# Patient Record
Sex: FEMALE | Race: WHITE | URBAN - METROPOLITAN AREA
[De-identification: names, ages, dates, MRNs, and addresses within clinical notes are randomized per-mention and may not be internally consistent; named-entity substitution may affect disease eponyms.]

---

## 2021-06-17 ENCOUNTER — OFFICE VISIT (OUTPATIENT)
Dept: PRIMARY CARE CLINIC | Age: 41
End: 2021-06-17
Payer: COMMERCIAL

## 2021-06-17 VITALS
BODY MASS INDEX: 36.2 KG/M2 | SYSTOLIC BLOOD PRESSURE: 147 MMHG | RESPIRATION RATE: 16 BRPM | WEIGHT: 258.6 LBS | HEIGHT: 71 IN | DIASTOLIC BLOOD PRESSURE: 100 MMHG | TEMPERATURE: 97.3 F | HEART RATE: 85 BPM

## 2021-06-17 DIAGNOSIS — Z76.89 ENCOUNTER TO ESTABLISH CARE: Primary | ICD-10-CM

## 2021-06-17 DIAGNOSIS — R03.0 ELEVATED BP WITHOUT DIAGNOSIS OF HYPERTENSION: ICD-10-CM

## 2021-06-17 DIAGNOSIS — L81.1 MELASMA: ICD-10-CM

## 2021-06-17 DIAGNOSIS — Z98.890 HISTORY OF BACK SURGERY: ICD-10-CM

## 2021-06-17 PROCEDURE — G8427 DOCREV CUR MEDS BY ELIG CLIN: HCPCS | Performed by: FAMILY MEDICINE

## 2021-06-17 PROCEDURE — 1036F TOBACCO NON-USER: CPT | Performed by: FAMILY MEDICINE

## 2021-06-17 PROCEDURE — 99204 OFFICE O/P NEW MOD 45 MIN: CPT | Performed by: FAMILY MEDICINE

## 2021-06-17 PROCEDURE — G8417 CALC BMI ABV UP PARAM F/U: HCPCS | Performed by: FAMILY MEDICINE

## 2021-06-17 RX ORDER — HYDROQUINONE 40 MG/G
CREAM TOPICAL
Qty: 1 TUBE | Refills: 1 | Status: SHIPPED | OUTPATIENT
Start: 2021-06-17 | End: 2022-01-06

## 2021-06-17 SDOH — ECONOMIC STABILITY: FOOD INSECURITY: WITHIN THE PAST 12 MONTHS, YOU WORRIED THAT YOUR FOOD WOULD RUN OUT BEFORE YOU GOT MONEY TO BUY MORE.: NEVER TRUE

## 2021-06-17 SDOH — ECONOMIC STABILITY: FOOD INSECURITY: WITHIN THE PAST 12 MONTHS, THE FOOD YOU BOUGHT JUST DIDN'T LAST AND YOU DIDN'T HAVE MONEY TO GET MORE.: NEVER TRUE

## 2021-06-17 ASSESSMENT — ENCOUNTER SYMPTOMS
VOMITING: 0
SHORTNESS OF BREATH: 0
SINUS PRESSURE: 0
RHINORRHEA: 0
DIARRHEA: 0
ABDOMINAL PAIN: 0
SORE THROAT: 0
CHEST TIGHTNESS: 0
BACK PAIN: 1
CONSTIPATION: 0
NAUSEA: 0
WHEEZING: 0
BLOOD IN STOOL: 0
SINUS PAIN: 0
COUGH: 0

## 2021-06-17 ASSESSMENT — PATIENT HEALTH QUESTIONNAIRE - PHQ9
SUM OF ALL RESPONSES TO PHQ9 QUESTIONS 1 & 2: 0
1. LITTLE INTEREST OR PLEASURE IN DOING THINGS: 0
DEPRESSION UNABLE TO ASSESS: FUNCTIONAL CAPACITY MOTIVATION LIMITS ACCURACY
2. FEELING DOWN, DEPRESSED OR HOPELESS: 0
SUM OF ALL RESPONSES TO PHQ QUESTIONS 1-9: 0

## 2021-06-17 ASSESSMENT — SOCIAL DETERMINANTS OF HEALTH (SDOH): HOW HARD IS IT FOR YOU TO PAY FOR THE VERY BASICS LIKE FOOD, HOUSING, MEDICAL CARE, AND HEATING?: NOT HARD AT ALL

## 2021-06-17 NOTE — PROGRESS NOTES
Chief Complaint   Patient presents with   [address]     would like labs done    Back Problem     would like to start PT back up         HPI:  Manuelito Kapoor is a 41 y.o. (: 1980) here today to establish care. She is worried about her weight and would like to update blood work. She is working on lifestyle changes. Elevated BP in triage. Interested in PT for her back pain and discomfort. Has h/o back surgery: laminectomy 2007 and 3 level fusion 2013 due to herniated disc slipping. Used to have PT on and off. Has also had trigger point injections  Curious about botox injects for this. H/o of Melasma on upper lip.used to use a topical medication but cannot remember the name. Would like a prescription for this. Is currently only using sunblock when she goes outside. Review of Systems   Constitutional: Negative for activity change, appetite change, chills, fatigue, fever and unexpected weight change. HENT: Negative for congestion, postnasal drip, rhinorrhea, sinus pressure, sinus pain, sneezing and sore throat. Eyes: Negative for visual disturbance. Respiratory: Negative for cough, chest tightness, shortness of breath and wheezing. Cardiovascular: Negative for chest pain and palpitations. Gastrointestinal: Negative for abdominal pain, blood in stool, constipation, diarrhea, nausea and vomiting. Endocrine: Negative for cold intolerance, heat intolerance, polydipsia and polyuria. Genitourinary: Negative for dysuria, frequency, vaginal bleeding and vaginal discharge. Musculoskeletal: Positive for back pain. Negative for arthralgias, joint swelling, myalgias and neck pain. Skin: Negative for rash and wound. Allergic/Immunologic: Negative for environmental allergies. Neurological: Negative for dizziness, tremors, syncope, weakness, light-headedness, numbness and headaches. Hematological: Negative for adenopathy.    Psychiatric/Behavioral: Negative for behavioral problems, decreased concentration, sleep disturbance and suicidal ideas. The patient is not nervous/anxious. History reviewed. No pertinent past medical history. Family History   Problem Relation Age of Onset    Heart Attack Father     High Blood Pressure Father        Social History     Tobacco Use    Smoking status: Former Smoker     Types: Cigarettes     Quit date: 2006     Years since quitting: 15.4    Smokeless tobacco: Never Used   Substance Use Topics    Alcohol use: Not on file    Drug use: Not on file       New Prescriptions    HYDROQUINONE 4 % CREAM    Apply topically 2 times daily. Meds Prior to visit:  No current outpatient medications on file prior to visit. No current facility-administered medications on file prior to visit. Allergies   Allergen Reactions    Iodine        OBJECTIVE:  BP (!) 147/100   Pulse 85   Temp 97.3 °F (36.3 °C) (Temporal)   Resp 16   Ht 5' 10.5\" (1.791 m)   Wt 258 lb 9.6 oz (117.3 kg)   LMP 06/11/2021   Breastfeeding No   BMI 36.58 kg/m²   BP Readings from Last 2 Encounters:   06/17/21 (!) 147/100     Wt Readings from Last 3 Encounters:   06/17/21 258 lb 9.6 oz (117.3 kg)       Physical Exam  Vitals reviewed. Constitutional:       General: She is not in acute distress. Appearance: Normal appearance. She is well-developed. She is obese. HENT:      Head: Normocephalic and atraumatic. Right Ear: Tympanic membrane, ear canal and external ear normal. There is no impacted cerumen. Left Ear: Tympanic membrane, ear canal and external ear normal. There is no impacted cerumen. Nose: Nose normal.      Mouth/Throat:      Mouth: Mucous membranes are moist.      Pharynx: Oropharynx is clear. No oropharyngeal exudate or posterior oropharyngeal erythema. Eyes:      General: No scleral icterus. Right eye: No discharge. Left eye: No discharge. Extraocular Movements: Extraocular movements intact. Conjunctiva/sclera: Conjunctivae normal.      Pupils: Pupils are equal, round, and reactive to light. Cardiovascular:      Rate and Rhythm: Normal rate and regular rhythm. Pulses: Normal pulses. Heart sounds: Normal heart sounds. No murmur heard. Comments: Radial and pedal pulses intact  Pulmonary:      Effort: Pulmonary effort is normal. No respiratory distress. Breath sounds: Normal breath sounds. No wheezing or rales. Chest:      Chest wall: No tenderness. Abdominal:      General: Bowel sounds are normal.      Palpations: Abdomen is soft. There is no mass. Tenderness: There is no abdominal tenderness. There is no guarding or rebound. Hernia: No hernia is present. Comments: Normal liver and spleen. No organomegaly   Musculoskeletal:         General: No tenderness. Normal range of motion. Cervical back: Normal range of motion and neck supple. Right lower leg: No edema. Left lower leg: No edema. Comments: Intact in all extremities   Lymphadenopathy:      Cervical: No cervical adenopathy. Skin:     General: Skin is warm. Capillary Refill: Capillary refill takes less than 2 seconds. Findings: No erythema or rash. Neurological:      General: No focal deficit present. Mental Status: She is alert and oriented to person, place, and time. Mental status is at baseline. Motor: No weakness or abnormal muscle tone. Coordination: Coordination normal.      Gait: Gait normal.      Deep Tendon Reflexes: Reflexes normal.   Psychiatric:         Mood and Affect: Mood normal.         Behavior: Behavior normal.         Thought Content: Thought content normal.         Judgment: Judgment normal.           ASSESSMENT/PLAN:  1. Encounter to establish care  VS reviewed     BMI reviewed   All questions answered. F/u discussed. Healthy lifestyle modifications discussed. - CBC Auto Differential; Future  - Comprehensive Metabolic Panel; Future    2. BMI 36.0-36.9,adult  Will update labs. Interested in bariatric surgery  Would like referral to weight management clinic. Will rule out thyroid disorder and screen for diabetes. - Lipid Panel; Future  - TSH with Reflex; Future  - HEMOGLOBIN A1C; Future  - [facility] - Rick Hobson DO, Bariatric Surgery, [place]    3. History of back surgery  Referral to physical therapy placed. History of laminectomy and fusion  - [facility] - [facility]    4. Elevated BP without diagnosis of hypertension  Follow-up in 2 weeks to monitor blood pressure  If elevated in 2 weeks, will discuss diagnosis of hypertension and treatment    5. Melasma  We will try hydroquinone 4% cream and follow-up in 2 weeks to gauge improvement  - hydroquinone 4 % cream; Apply topically 2 times daily. Dispense: 1 Tube; Refill: 1        Discussed use, benefit, and side effects of prescribed medications. Barriers to medication compliance addressed. All patient questions answered. Pt voiced understanding. RTC No follow-ups on file.     Future Appointments   Date Time Provider Evelina Concepcion   7/1/2021  8:00 AM MD Sergio Ferguson MD  6/17/2021  2:21 PM

## 2021-06-18 DIAGNOSIS — Z76.89 ENCOUNTER TO ESTABLISH CARE: ICD-10-CM

## 2021-06-18 LAB
A/G RATIO: 1.8 (ref 1.1–2.2)
ALBUMIN SERPL-MCNC: 4.8 G/DL (ref 3.4–5)
ALP BLD-CCNC: 72 U/L (ref 40–129)
ALT SERPL-CCNC: 31 U/L (ref 10–40)
ANION GAP SERPL CALCULATED.3IONS-SCNC: 15 MMOL/L (ref 3–16)
AST SERPL-CCNC: 27 U/L (ref 15–37)
BASOPHILS ABSOLUTE: 0 K/UL (ref 0–0.2)
BASOPHILS RELATIVE PERCENT: 0.5 %
BILIRUB SERPL-MCNC: 0.5 MG/DL (ref 0–1)
BUN BLDV-MCNC: 9 MG/DL (ref 7–20)
CALCIUM SERPL-MCNC: 9.6 MG/DL (ref 8.3–10.6)
CHLORIDE BLD-SCNC: 101 MMOL/L (ref 99–110)
CHOLESTEROL, TOTAL: 234 MG/DL (ref 0–199)
CO2: 22 MMOL/L (ref 21–32)
CREAT SERPL-MCNC: 0.7 MG/DL (ref 0.6–1.1)
EOSINOPHILS ABSOLUTE: 0.1 K/UL (ref 0–0.6)
EOSINOPHILS RELATIVE PERCENT: 0.8 %
GFR AFRICAN AMERICAN: >60
GFR NON-AFRICAN AMERICAN: >60
GLOBULIN: 2.6 G/DL
GLUCOSE BLD-MCNC: 99 MG/DL (ref 70–99)
HCT VFR BLD CALC: 40.7 % (ref 36–48)
HDLC SERPL-MCNC: 60 MG/DL (ref 40–60)
HEMOGLOBIN: 13.6 G/DL (ref 12–16)
LDL CHOLESTEROL CALCULATED: 147 MG/DL
LYMPHOCYTES ABSOLUTE: 1.7 K/UL (ref 1–5.1)
LYMPHOCYTES RELATIVE PERCENT: 24.4 %
MCH RBC QN AUTO: 31.3 PG (ref 26–34)
MCHC RBC AUTO-ENTMCNC: 33.6 G/DL (ref 31–36)
MCV RBC AUTO: 93.3 FL (ref 80–100)
MONOCYTES ABSOLUTE: 0.5 K/UL (ref 0–1.3)
MONOCYTES RELATIVE PERCENT: 6.9 %
NEUTROPHILS ABSOLUTE: 4.8 K/UL (ref 1.7–7.7)
NEUTROPHILS RELATIVE PERCENT: 67.4 %
PDW BLD-RTO: 12.4 % (ref 12.4–15.4)
PLATELET # BLD: 314 K/UL (ref 135–450)
PMV BLD AUTO: 8.9 FL (ref 5–10.5)
POTASSIUM SERPL-SCNC: 4.4 MMOL/L (ref 3.5–5.1)
RBC # BLD: 4.36 M/UL (ref 4–5.2)
SODIUM BLD-SCNC: 138 MMOL/L (ref 136–145)
TOTAL PROTEIN: 7.4 G/DL (ref 6.4–8.2)
TRIGL SERPL-MCNC: 133 MG/DL (ref 0–150)
TSH REFLEX: 2.24 UIU/ML (ref 0.27–4.2)
VLDLC SERPL CALC-MCNC: 27 MG/DL
WBC # BLD: 7.1 K/UL (ref 4–11)

## 2021-06-19 LAB
ESTIMATED AVERAGE GLUCOSE: 99.7 MG/DL
HBA1C MFR BLD: 5.1 %

## 2021-06-28 ENCOUNTER — TELEPHONE (OUTPATIENT)
Dept: PRIMARY CARE CLINIC | Age: 41
End: 2021-06-28

## 2021-06-28 DIAGNOSIS — Z98.890 HISTORY OF BACK SURGERY: Primary | ICD-10-CM

## 2021-06-28 NOTE — TELEPHONE ENCOUNTER
----- Message from Huang Zhang sent at 6/28/2021  9:37 AM EDT -----  Subject: Message to Provider    QUESTIONS  Information for Provider? Patient is calling because her PCP referred the   patient for physician therapy but the location referred doesn't accept the   patient's insurance. The patient is requesting for the doctor to write a   script for [facility] as they are covered by her insurance. ---------------------------------------------------------------------------  --------------  Ludin SMITH  What is the best way for the office to contact you? OK to respond with   electronic message via [employer] portal (only for patients who have   registered [employer] account)  Preferred Call Back Phone Number? [phone]  ---------------------------------------------------------------------------  --------------  SCRIPT ANSWERS  Relationship to Patient?  Self

## 2021-06-30 NOTE — TELEPHONE ENCOUNTER
Please print and fax the [facility] referral placed today. Phone number of the office is on the referral but we need a fax number.   Thank you,  Dr. Staton Part

## 2021-06-30 NOTE — PROGRESS NOTES
Chief Complaint   Patient presents with    Discuss Labs         HPI:  Janeth Kinney is a 41 y.o. (: 1980) here today for BP check. BP looks great in triage! Wt mgmt appt set up for tomorrow. BP Readings from Last 3 Encounters:   21 125/81   21 (!) 147/100       Wt Readings from Last 3 Encounters:   21 260 lb (117.9 kg)   21 258 lb 9.6 oz (117.3 kg)     Reviewed results and discussed elevated cholesterol and LDL and total cholesterol goals. HLD  Rx: no med  The 10-year ASCVD risk score (Nadeem Vega, et al., 2013) is: 0.7%    Values used to calculate the score:      Age: 41 years      Sex: Female      Is Non- : No      Diabetic: No      Tobacco smoker: No      Systolic Blood Pressure: 268 mmHg      Is BP treated: No      HDL Cholesterol: 60 mg/dL      Total Cholesterol: 234 mg/dL      Review of Systems   Constitutional: Negative for appetite change, chills, fatigue and fever. HENT: Negative for congestion. Eyes: Negative for pain and visual disturbance. Respiratory: Negative for cough and shortness of breath. Cardiovascular: Negative for chest pain and palpitations. Gastrointestinal: Negative for abdominal pain, constipation and diarrhea. Genitourinary: Negative for difficulty urinating. Musculoskeletal: Negative for arthralgias. Skin: Negative for rash and wound. Neurological: Negative for dizziness, weakness, light-headedness and headaches. Hematological: Does not bruise/bleed easily. Psychiatric/Behavioral: Negative for behavioral problems. History reviewed. No pertinent past medical history.     Family History   Problem Relation Age of Onset    Heart Attack Father     High Blood Pressure Father        Social History     Tobacco Use    Smoking status: Former Smoker     Types: Cigarettes     Quit date:      Years since quitting: 15.5    Smokeless tobacco: Never Used   Substance Use Topics    Alcohol use: Not on file    Drug use: Not on file       New Prescriptions    No medications on file       Meds Prior to visit:  Current Outpatient Medications on File Prior to Visit   Medication Sig Dispense Refill    hydroquinone 4 % cream Apply topically 2 times daily. 1 Tube 1     No current facility-administered medications on file prior to visit. Allergies   Allergen Reactions    Iodine        OBJECTIVE:  /81 (Site: Right Upper Arm, Position: Sitting)   Pulse 80   Temp 97.7 °F (36.5 °C) (Temporal)   Wt 260 lb (117.9 kg)   LMP 06/11/2021   Breastfeeding No   BMI 36.78 kg/m²   BP Readings from Last 2 Encounters:   07/01/21 125/81   06/17/21 (!) 147/100     Wt Readings from Last 3 Encounters:   07/01/21 260 lb (117.9 kg)   06/17/21 258 lb 9.6 oz (117.3 kg)       Physical Exam  Vitals reviewed. Constitutional:       General: She is not in acute distress. Appearance: Normal appearance. She is not ill-appearing. HENT:      Head: Normocephalic and atraumatic. Right Ear: External ear normal.      Left Ear: External ear normal.      Nose: Nose normal. No congestion. Mouth/Throat:      Mouth: Mucous membranes are moist.      Pharynx: Oropharynx is clear. No oropharyngeal exudate. Eyes:      Extraocular Movements: Extraocular movements intact. Conjunctiva/sclera: Conjunctivae normal.      Pupils: Pupils are equal, round, and reactive to light. Cardiovascular:      Rate and Rhythm: Normal rate and regular rhythm. Pulses: Normal pulses. Heart sounds: Normal heart sounds. Pulmonary:      Effort: Pulmonary effort is normal. No respiratory distress. Breath sounds: Normal breath sounds. No stridor. No wheezing, rhonchi or rales. Abdominal:      General: Bowel sounds are normal.      Palpations: Abdomen is soft. Tenderness: There is no abdominal tenderness. Musculoskeletal:         General: Normal range of motion. Cervical back: Normal range of motion and neck supple. Right lower leg: No edema. Left lower leg: No edema. Skin:     General: Skin is warm. Capillary Refill: Capillary refill takes less than 2 seconds. Findings: No erythema or rash. Neurological:      General: No focal deficit present. Mental Status: She is alert and oriented to person, place, and time. Mental status is at baseline. Motor: No weakness. Coordination: Coordination normal.      Gait: Gait normal.   Psychiatric:         Mood and Affect: Mood normal.         Behavior: Behavior normal.         Thought Content: Thought content normal.         Judgment: Judgment normal.         Lab Results   Component Value Date    WBC 7.1 06/18/2021    HGB 13.6 06/18/2021    HCT 40.7 06/18/2021    MCV 93.3 06/18/2021     06/18/2021     Lab Results   Component Value Date     06/18/2021    K 4.4 06/18/2021     06/18/2021    CO2 22 06/18/2021    BUN 9 06/18/2021    CREATININE 0.7 06/18/2021    GLUCOSE 99 06/18/2021    CALCIUM 9.6 06/18/2021    PROT 7.4 06/18/2021    LABALBU 4.8 06/18/2021    BILITOT 0.5 06/18/2021    ALKPHOS 72 06/18/2021    AST 27 06/18/2021    ALT 31 06/18/2021    LABGLOM >60 06/18/2021    GFRAA >60 06/18/2021    AGRATIO 1.8 06/18/2021    GLOB 2.6 06/18/2021     Lab Results   Component Value Date    CHOL 234 (H) 06/18/2021     Lab Results   Component Value Date    TRIG 133 06/18/2021     Lab Results   Component Value Date    HDL 60 06/18/2021     Lab Results   Component Value Date    LDLCALC 147 (H) 06/18/2021     Lab Results   Component Value Date    LABVLDL 27 06/18/2021     Lab Results   Component Value Date    LABA1C 5.1 06/18/2021         ASSESSMENT/PLAN:  1. Elevated BP without diagnosis of hypertension  Controlled in triage  States the last time is elevated likely because she finished argument with her  before coming in. No treatment today  Keep an course of weight management, lifestyle changes and dietary modifications    2.  BMI 36.0-36.9,adult  Has weight management appointment tomorrow with Dr. Merry Ortiz    3. Mixed hyperlipidemia  Discussed dietary modifications and ways to work on her cholesterol without medications. Keep an course of weight management, lifestyle changes and dietary modifications  Discussed ASCVD risk    I have spent 30 minutes of face to face time with the patient with more than 50%  spent counseling , educating and coordinating care for wt mgmt, BP control and HLD. Discussed use, benefit, and side effects of prescribed medications. Barriers to medication compliance addressed. All patient questions answered. Pt voiced understanding. RTC Return if symptoms worsen or fail to improve.     Future Appointments   Date Time Provider Evelina Concepcion   7/2/2021 12:30 PM SCHEDULE, [facility]   7/11/2021  3:30 PM Letitia Mckenzie MD [facility]       Gabriela Alcazar MD  7/1/2021  8:38 AM

## 2021-07-01 ENCOUNTER — OFFICE VISIT (OUTPATIENT)
Dept: PRIMARY CARE CLINIC | Age: 41
End: 2021-07-01
Payer: COMMERCIAL

## 2021-07-01 VITALS
HEART RATE: 80 BPM | TEMPERATURE: 97.7 F | SYSTOLIC BLOOD PRESSURE: 125 MMHG | WEIGHT: 260 LBS | BODY MASS INDEX: 36.78 KG/M2 | DIASTOLIC BLOOD PRESSURE: 81 MMHG

## 2021-07-01 DIAGNOSIS — E78.2 MIXED HYPERLIPIDEMIA: ICD-10-CM

## 2021-07-01 DIAGNOSIS — R03.0 ELEVATED BP WITHOUT DIAGNOSIS OF HYPERTENSION: Primary | ICD-10-CM

## 2021-07-01 PROCEDURE — 1036F TOBACCO NON-USER: CPT | Performed by: FAMILY MEDICINE

## 2021-07-01 PROCEDURE — G8417 CALC BMI ABV UP PARAM F/U: HCPCS | Performed by: FAMILY MEDICINE

## 2021-07-01 PROCEDURE — G8427 DOCREV CUR MEDS BY ELIG CLIN: HCPCS | Performed by: FAMILY MEDICINE

## 2021-07-01 PROCEDURE — 99214 OFFICE O/P EST MOD 30 MIN: CPT | Performed by: FAMILY MEDICINE

## 2021-07-01 ASSESSMENT — ENCOUNTER SYMPTOMS
EYE PAIN: 0
SHORTNESS OF BREATH: 0
ABDOMINAL PAIN: 0
COUGH: 0
DIARRHEA: 0
CONSTIPATION: 0

## 2021-07-02 ENCOUNTER — OFFICE VISIT (OUTPATIENT)
Dept: BARIATRICS/WEIGHT MGMT | Age: 41
End: 2021-07-02

## 2021-07-02 VITALS
HEIGHT: 71 IN | BODY MASS INDEX: 36.12 KG/M2 | DIASTOLIC BLOOD PRESSURE: 70 MMHG | WEIGHT: 258 LBS | HEART RATE: 66 BPM | SYSTOLIC BLOOD PRESSURE: 122 MMHG

## 2021-07-02 DIAGNOSIS — E66.9 OBESITY (BMI 30-39.9): ICD-10-CM

## 2021-07-02 PROCEDURE — 99999 PR OFFICE/OUTPT VISIT,PROCEDURE ONLY: CPT | Performed by: FAMILY MEDICINE

## 2021-07-02 RX ORDER — IBUPROFEN 200 MG
200 TABLET ORAL EVERY 6 HOURS PRN
COMMUNITY
End: 2022-01-06

## 2021-07-02 RX ORDER — ACETAMINOPHEN 500 MG
500 TABLET ORAL EVERY 6 HOURS PRN
COMMUNITY
End: 2022-01-06

## 2021-07-02 RX ORDER — CETIRIZINE HYDROCHLORIDE 10 MG/1
10 TABLET ORAL DAILY
COMMUNITY
End: 2022-01-06

## 2021-07-02 NOTE — PROGRESS NOTES
Jose Walter is a 41 y.o. female with a date of birth of 1980. Vitals:    07/02/21 1241   BP: 122/70   Pulse: 66   Weight: 258 lb (117 kg)   Height: 5' 11\" (1.803 m)    BMI: Body mass index is 35.98 kg/m². Obesity Classification: Class II    Weight History: Wt Readings from Last 3 Encounters:   07/02/21 258 lb (117 kg)   07/01/21 260 lb (117.9 kg)   06/17/21 258 lb 9.6 oz (117.3 kg)       Patient's lowest adult weight was 180 lb at age 26. Patient's highest adult weight was 258 lb at age 41. Patient has participated in the following weight loss programs: , counseling with RD, physician supervised diet and calorie restriction. Patient has participated in meal replacement/liquid diets - Slimfast.  Patient has participated in weight loss medications - Saxenda in 2020 with wt loss of 15-20#, stopped d/t side effects (fatigue). Patient is not lactose intolerant. Patient does have Restoration/cultural food concerns - pt has followed vegetarian lifestyle for ~ 30yrs, dislikes egg. Patient does have food allergies - shellfish. Pt reports good sleep routine. Struggles with grazing in evening. May benefit from seeing behaviorist.    24 hour recall/food frequency chart:  Wakes 5-5:30a  Breakfast: no.   Snack: yes tomato juice  Lunch: yes. - 11-11:30a 2 sandwiches + granola bar / yogurt  Snack: yes. - Fig bar + yogurt  Dinner: yes. - 2 sloppy jennifer's + homemade fries + salad / protein + starch + veggies  Snack: yes. - grazing in evening - bowl of PB cereal with full fat oatmilk + ice cream  Drinks throughout the day: Keenan Garcia with a little white grape fruit juice, coffee with half & half  Do you drink alcohol? yes. How often/how much alcohol do you drink: 3 drinks of glass of wine / tequila cocktail per week. Patient may meet the criteria for binge eating disorder - takes a lot of food to feel full. Patient does have grazing. Patient does not have night eating. Patient does have a history of or eating out of boredom. Goals  Weight: 185-195#  Health Improvement: improved back & joint pain, be able to do all the things would like and be active    Assessment  Nutritional Needs: RMR=(9.99 x 117) + (6.25 x 180.3) - (4.92 x 40 y.o.) -161  = 1925 kcal x 1.4 (light / sedentary activity factor)= 2696 kcal - 1000 (for 2 lb weight loss/week)= 1696 kcal.    Plan  Plan/Recommendations: General weight loss/lifestyle modification strategies discussed (elicit support from others; identify saboteurs; non-food rewards, etc). Diet interventions: 1500kcal LCMP. Optifast:  Is not interested  Diet Medications:  Is interested    Handouts: 1500kcal LCMP, 9\" plate    PES Statement:  Overweight/Obesity related to increased caloric intake and decreased physical activity as evidenced by BMI. Body mass index is 35.98 kg/m². Will follow up as necessary.     Hunter Montgomery, RD, LD

## 2021-07-11 ENCOUNTER — TELEMEDICINE (OUTPATIENT)
Dept: BARIATRICS/WEIGHT MGMT | Age: 41
End: 2021-07-11
Payer: COMMERCIAL

## 2021-07-11 DIAGNOSIS — E66.9 CLASS 2 OBESITY: Primary | ICD-10-CM

## 2021-07-11 DIAGNOSIS — Z71.3 DIETARY COUNSELING AND SURVEILLANCE: ICD-10-CM

## 2021-07-11 PROCEDURE — 99204 OFFICE O/P NEW MOD 45 MIN: CPT | Performed by: FAMILY MEDICINE

## 2021-07-11 PROCEDURE — G8427 DOCREV CUR MEDS BY ELIG CLIN: HCPCS | Performed by: FAMILY MEDICINE

## 2021-07-11 ASSESSMENT — ENCOUNTER SYMPTOMS
APNEA: 0
CHEST TIGHTNESS: 0
BLOOD IN STOOL: 0
EYE PAIN: 0
COUGH: 0
ABDOMINAL PAIN: 0
NAUSEA: 0
VOMITING: 0
SHORTNESS OF BREATH: 0
CONSTIPATION: 0
WHEEZING: 0
CHOKING: 0
PHOTOPHOBIA: 0
DIARRHEA: 0
ABDOMINAL DISTENTION: 0

## 2021-07-11 NOTE — PROGRESS NOTES
Patient: Kaela Muro     Encounter Date: 7/11/2021    YOB: 1980               Age: 41 y.o. Patient identification was verified at the start of the visit. No flowsheet data found. BP Readings from Last 1 Encounters:   07/02/21 122/70       BMI Readings from Last 1 Encounters:   07/02/21 35.98 kg/m²       Pulse Readings from Last 1 Encounters:   07/02/21 66                                              Wt Readings from Last 3 Encounters:   07/02/21 258 lb (117 kg)   07/01/21 260 lb (117.9 kg)   06/17/21 258 lb 9.6 oz (117.3 kg)       Chief Complaint   Patient presents with    Bariatric, Initial Visit     [facility]       HPI:    41 y.o. female presents to [facility] care via video visit. She was referred by Dr. Belkis Giordano for medical weight management. The patient's medical history is significant for class II obesity. The patient has a long-standing history of obesity which started gradually. The problem is moderate. The patient has been gaining weight. Risk factors include annual weight gain of >2 lbs (1 kg)/ year and sedentary lifestyle. Aggravating factors include poor diet and lack of physical activity. The patient has tried various diet/exercise plans which have been ineffective in the long-run. She is interested in anti-obesity medications to help with appetite regulation. When did you become overweight? [] Childhood   [x] Teens   [] Adulthood   [] Pregnancy   [] Menopause    Highest adult weight: 258 pounds at age 41     Triggers for weight gain? [] Stress   [] Illness   [] Medications   [] Travel  []Injury     [] Nightshift work   [] Insomnia  [x] No specific triggers   [] Other    Food triggers:   [x] Stress   [x] Boredom   [] Fast food   [] Eating out   [] Seeking reward   [] Social     Have you ever taken medications to help you lose weight?    [x] Yes- Saxenda   [] No    Have you ever been on a meal replacement program?  [] Yes  [x] No            Dietitian's assessment reviewed and addressed with patient. Reviewed:  [x] Nutrition and the importance of regular protein intake  [x] Hidden CHO/carbohydrate sources  [x] Alcohol use  [x] Tobacco use  [x] Drug use- Denies   [x] Importance of exercise and reducing sedentary time        Controlled Substance Monitoring:     No flowsheet data found. Allergies   Allergen Reactions    Iodine          Current Outpatient Medications:     ibuprofen (ADVIL;MOTRIN) 200 MG tablet, Take 200 mg by mouth every 6 hours as needed for Pain, Disp: , Rfl:     acetaminophen (TYLENOL) 500 MG tablet, Take 500 mg by mouth every 6 hours as needed for Pain, Disp: , Rfl:     cetirizine (ZYRTEC) 10 MG tablet, Take 10 mg by mouth daily, Disp: , Rfl:     hydroquinone 4 % cream, Apply topically 2 times daily. , Disp: 1 Tube, Rfl: 1    Patient Active Problem List   Diagnosis    BMI 36.0-36.9,adult    Obesity (BMI 30-39. 9)       Past Medical History:   Diagnosis Date    Arthritis     Back pain     Migraine     Obesity (BMI 30-39. 9)        Past Surgical History:   Procedure Laterality Date    BACK SURGERY  2013    BACK SURGERY  2007    NASAL SEPTUM SURGERY  2015    SPINE SURGERY         Family History   Problem Relation Age of Onset    Migraines Mother     Heart Attack Father     High Blood Pressure Father     Hypertension Father     Elevated Lipids Father     Alcohol Abuse Father     COPD Maternal Grandmother     Asthma Maternal Grandmother        Review of Systems   Constitutional: Negative for fatigue. Eyes: Negative for photophobia, pain and visual disturbance. Respiratory: Negative for apnea, cough, choking, chest tightness, shortness of breath and wheezing. Cardiovascular: Negative for chest pain, palpitations and leg swelling. Gastrointestinal: Negative for abdominal distention, abdominal pain, blood in stool, constipation, diarrhea, nausea and vomiting.    Endocrine: Negative for cold intolerance and heat intolerance. Musculoskeletal: Negative for arthralgias and myalgias. Skin: Negative for rash. Neurological: Negative for dizziness, tremors, syncope, weakness, numbness and headaches. Psychiatric/Behavioral: Negative for agitation, confusion, decreased concentration, dysphoric mood, hallucinations, sleep disturbance and suicidal ideas. The patient is not nervous/anxious and is not hyperactive. Physical Exam  Constitutional:       Appearance: She is well-developed. HENT:      Head: Normocephalic. Eyes:      Conjunctiva/sclera: Conjunctivae normal.   Abdominal:      General: Abdomen is protuberant. Musculoskeletal:         General: No swelling. Neurological:      Mental Status: She is alert and oriented to person, place, and time. Psychiatric:         Mood and Affect: Mood normal.         Behavior: Behavior normal.         Thought Content: Thought content normal.         Judgment: Judgment normal.         Orders Only on 06/18/2021   Component Date Value Ref Range Status    Hemoglobin A1C 06/18/2021 5.1  See comment % Final    Comment: Comment:  Diagnosis of Diabetes: > or = 6.5%  Increased risk of diabetes (Prediabetes): 5.7-6.4%  Glycemic Control: Nonpregnant Adults: <7.0%                    Pregnant: <6.0%        eAG 06/18/2021 99.7  mg/dL Final    TSH 06/18/2021 2.24  0.27 - 4.20 uIU/mL Final    Sodium 06/18/2021 138  136 - 145 mmol/L Final    Potassium 06/18/2021 4.4  3.5 - 5.1 mmol/L Final    Chloride 06/18/2021 101  99 - 110 mmol/L Final    CO2 06/18/2021 22  21 - 32 mmol/L Final    Anion Gap 06/18/2021 15  3 - 16 Final    Glucose 06/18/2021 99  70 - 99 mg/dL Final    BUN 06/18/2021 9  7 - 20 mg/dL Final    CREATININE 06/18/2021 0.7  0.6 - 1.1 mg/dL Final    GFR Non- 06/18/2021 >60  >60 Final    Comment: >60 mL/min/1.73m2 EGFR, calc. for ages 25 and older using the  MDRD formula (not corrected for weight), is valid for stable  renal function.       GFR  American 06/18/2021 >60  >60 Final    Comment: Chronic Kidney Disease: less than 60 ml/min/1.73 sq.m. Kidney Failure: less than 15 ml/min/1.73 sq.m. Results valid for patients 18 years and older.       Calcium 06/18/2021 9.6  8.3 - 10.6 mg/dL Final    Total Protein 06/18/2021 7.4  6.4 - 8.2 g/dL Final    Albumin 06/18/2021 4.8  3.4 - 5.0 g/dL Final    Albumin/Globulin Ratio 06/18/2021 1.8  1.1 - 2.2 Final    Total Bilirubin 06/18/2021 0.5  0.0 - 1.0 mg/dL Final    Alkaline Phosphatase 06/18/2021 72  40 - 129 U/L Final    ALT 06/18/2021 31  10 - 40 U/L Final    AST 06/18/2021 27  15 - 37 U/L Final    Globulin 06/18/2021 2.6  g/dL Final    WBC 06/18/2021 7.1  4.0 - 11.0 K/uL Final    RBC 06/18/2021 4.36  4.00 - 5.20 M/uL Final    Hemoglobin 06/18/2021 13.6  12.0 - 16.0 g/dL Final    Hematocrit 06/18/2021 40.7  36.0 - 48.0 % Final    MCV 06/18/2021 93.3  80.0 - 100.0 fL Final    MCH 06/18/2021 31.3  26.0 - 34.0 pg Final    MCHC 06/18/2021 33.6  31.0 - 36.0 g/dL Final    RDW 06/18/2021 12.4  12.4 - 15.4 % Final    Platelets 73/16/1486 314  135 - 450 K/uL Final    MPV 06/18/2021 8.9  5.0 - 10.5 fL Final    Neutrophils % 06/18/2021 67.4  % Final    Lymphocytes % 06/18/2021 24.4  % Final    Monocytes % 06/18/2021 6.9  % Final    Eosinophils % 06/18/2021 0.8  % Final    Basophils % 06/18/2021 0.5  % Final    Neutrophils Absolute 06/18/2021 4.8  1.7 - 7.7 K/uL Final    Lymphocytes Absolute 06/18/2021 1.7  1.0 - 5.1 K/uL Final    Monocytes Absolute 06/18/2021 0.5  0.0 - 1.3 K/uL Final    Eosinophils Absolute 06/18/2021 0.1  0.0 - 0.6 K/uL Final    Basophils Absolute 06/18/2021 0.0  0.0 - 0.2 K/uL Final    Cholesterol, Total 06/18/2021 234* 0 - 199 mg/dL Final    Triglycerides 06/18/2021 133  0 - 150 mg/dL Final    HDL 06/18/2021 60  40 - 60 mg/dL Final    LDL Calculated 06/18/2021 147* <100 mg/dL Final    VLDL Cholesterol Calculated 06/18/2021 27  Not Established mg/dL Final Motivational interviewing performed    [x] Referral for counseling  [x] Discussed strategies to overcome habits/challenges for focus      [x] Stress management   [x] Stimulus control  [x] Sleep hygiene      Orders Placed This Encounter   Procedures    Vitamin B12 & Folate     Standing Status:   Future     Standing Expiration Date:   7/11/2022    Vitamin D 25 Hydroxy     Standing Status:   Future     Standing Expiration Date:   7/11/2022    EKG 12 Lead     Standing Status:   Future     Standing Expiration Date:   7/11/2022     Order Specific Question:   Reason for Exam?     Answer: Other       No follow-ups on file. Peggy Vizcaino is a 41 y.o. female being evaluated by a Virtual Visit (video visit) encounter to address concerns as mentioned above. A caregiver was present when appropriate. Due to this being a TeleHealth encounter (During WPXXY-03 public health emergency), evaluation of the following organ systems was limited: Vitals/Constitutional/EENT/Resp/CV/GI//MS/Neuro/Skin/Heme-Lymph-Imm. Pursuant to the emergency declaration under the [address] [place] [address] and the [employer] and [employer] Act, this Virtual Visit was conducted with patient's (and/or legal guardian's) consent, to reduce the patient's risk of exposure to COVID-19 and provide necessary medical care. The patient (and/or legal guardian) has also been advised to contact this office for worsening conditions or problems, and seek emergency medical treatment and/or call 911 if deemed necessary. Services were provided through a video synchronous discussion virtually to substitute for in-person clinic visit. Patient and provider were located at their individual homes. --Cornell Corona MD on 7/11/2021 at 10:58 PM    An electronic signature was used to authenticate this note.      Greater than 50% of this 45 minute visit was used in direct counseling.

## 2021-07-13 ENCOUNTER — TELEPHONE (OUTPATIENT)
Dept: BARIATRICS/WEIGHT MGMT | Age: 41
End: 2021-07-13

## 2021-07-14 ENCOUNTER — NURSE ONLY (OUTPATIENT)
Dept: PRIMARY CARE CLINIC | Age: 41
End: 2021-07-14
Payer: COMMERCIAL

## 2021-07-14 DIAGNOSIS — E66.9 CLASS 2 OBESITY: ICD-10-CM

## 2021-07-14 DIAGNOSIS — Z00.00 HEALTHCARE MAINTENANCE: ICD-10-CM

## 2021-07-14 DIAGNOSIS — E66.9 OBESITY (BMI 30-39.9): Primary | ICD-10-CM

## 2021-07-14 PROCEDURE — 93000 ELECTROCARDIOGRAM COMPLETE: CPT | Performed by: FAMILY MEDICINE

## 2021-07-15 LAB
FOLATE: 6.98 NG/ML (ref 4.78–24.2)
VITAMIN B-12: 335 PG/ML (ref 211–911)
VITAMIN D 25-HYDROXY: 12.8 NG/ML

## 2021-07-19 ENCOUNTER — PATIENT MESSAGE (OUTPATIENT)
Dept: PRIMARY CARE CLINIC | Age: 41
End: 2021-07-19

## 2021-07-19 DIAGNOSIS — M54.50 CHRONIC BILATERAL LOW BACK PAIN WITHOUT SCIATICA: Primary | ICD-10-CM

## 2021-07-19 DIAGNOSIS — G89.29 CHRONIC BILATERAL LOW BACK PAIN WITHOUT SCIATICA: Primary | ICD-10-CM

## 2021-07-19 DIAGNOSIS — Z98.890 HISTORY OF BACK SURGERY: ICD-10-CM

## 2021-07-19 RX ORDER — LIDOCAINE 50 MG/G
1 PATCH TOPICAL DAILY
Qty: 30 PATCH | Refills: 0 | Status: SHIPPED | OUTPATIENT
Start: 2021-07-19 | End: 2021-08-18

## 2021-07-23 ENCOUNTER — TELEPHONE (OUTPATIENT)
Dept: PRIMARY CARE CLINIC | Age: 41
End: 2021-07-23

## 2021-07-23 NOTE — TELEPHONE ENCOUNTER
lidocaine (LIDODERM) 5 % [[id number]]     Order Details  Dose: 1 patch Route: Transdermal Frequency: DAILY   Dispense Quantity: 30 patch Refills: 0          Sig: Place 1 patch onto the skin daily 12 hours on, 12 hours off.         Start Date: 07/19/21 End Date: 08/18/21 after 30 doses   Written Date: 07/19/21 Expiration Date: 07/19/22       Diagnosis Association: History of back surgery (Z98.890);  Chronic bilateral low back pain without sciatica (M54.5 , G89.29)   Providers    Authorizing Provider: Umberto Medina MD NPI: [id number]   Ordering User:  Umberto Medina MD        Pharmacy    [id number] - [address], [address] [phone] - F [phone]   [address] [address] [address] [facility] [address]   Phone:  [phone]  Fax:  [phone]

## 2021-07-27 NOTE — TELEPHONE ENCOUNTER
Received DENIAL for Lidocaine 5% patches. See denial letter attached. Please notify patient. Thank you.

## 2021-08-06 ENCOUNTER — TELEPHONE (OUTPATIENT)
Dept: BARIATRICS/WEIGHT MGMT | Age: 41
End: 2021-08-06

## 2021-08-06 ENCOUNTER — TELEMEDICINE (OUTPATIENT)
Dept: BARIATRICS/WEIGHT MGMT | Age: 41
End: 2021-08-06
Payer: COMMERCIAL

## 2021-08-06 DIAGNOSIS — Z71.3 DIETARY COUNSELING AND SURVEILLANCE: ICD-10-CM

## 2021-08-06 DIAGNOSIS — E55.9 VITAMIN D DEFICIENCY: ICD-10-CM

## 2021-08-06 DIAGNOSIS — E66.9 CLASS 2 OBESITY: Primary | ICD-10-CM

## 2021-08-06 DIAGNOSIS — E66.9 CLASS 2 OBESITY: ICD-10-CM

## 2021-08-06 PROCEDURE — G8427 DOCREV CUR MEDS BY ELIG CLIN: HCPCS | Performed by: FAMILY MEDICINE

## 2021-08-06 PROCEDURE — 99214 OFFICE O/P EST MOD 30 MIN: CPT | Performed by: FAMILY MEDICINE

## 2021-08-06 RX ORDER — ERGOCALCIFEROL 1.25 MG/1
50000 CAPSULE ORAL WEEKLY
Qty: 8 CAPSULE | Refills: 0 | Status: SHIPPED | OUTPATIENT
Start: 2021-08-06 | End: 2022-01-06 | Stop reason: ALTCHOICE

## 2021-08-06 RX ORDER — PHENTERMINE AND TOPIRAMATE 7.5; 46 MG/1; MG/1
1 CAPSULE, EXTENDED RELEASE ORAL DAILY
Qty: 30 CAPSULE | Refills: 0 | Status: SHIPPED
Start: 2021-08-06 | End: 2021-08-12

## 2021-08-06 RX ORDER — PHENTERMINE AND TOPIRAMATE 3.75; 23 MG/1; MG/1
1 CAPSULE, EXTENDED RELEASE ORAL DAILY
Qty: 14 CAPSULE | Refills: 0 | Status: SHIPPED
Start: 2021-08-06 | End: 2021-08-12

## 2021-08-06 ASSESSMENT — ENCOUNTER SYMPTOMS
CHOKING: 0
VOMITING: 0
PHOTOPHOBIA: 0
APNEA: 0
ABDOMINAL DISTENTION: 0
ABDOMINAL PAIN: 0
BLOOD IN STOOL: 0
CHEST TIGHTNESS: 0
WHEEZING: 0
EYE PAIN: 0
COUGH: 0
NAUSEA: 0
DIARRHEA: 0
SHORTNESS OF BREATH: 0
CONSTIPATION: 0

## 2021-08-06 NOTE — TELEPHONE ENCOUNTER
Patient was prescribed qsymia today by Dr Giuliana Barr.     Pt advised to contact the office after starting medication to schedule 2wk f/u appt

## 2021-08-06 NOTE — PROGRESS NOTES
Apply topically 2 times daily. , Disp: 1 Tube, Rfl: 1    Patient Active Problem List   Diagnosis    BMI 36.0-36.9,adult    Obesity (BMI 30-39. 9)       Review of Systems   Constitutional: Negative for fatigue. Eyes: Negative for photophobia, pain and visual disturbance. Respiratory: Negative for apnea, cough, choking, chest tightness, shortness of breath and wheezing. Cardiovascular: Negative for chest pain, palpitations and leg swelling. Gastrointestinal: Negative for abdominal distention, abdominal pain, blood in stool, constipation, diarrhea, nausea and vomiting. Endocrine: Negative for cold intolerance and heat intolerance. Musculoskeletal: Negative for arthralgias and myalgias. Skin: Negative for rash. Neurological: Negative for dizziness, tremors, syncope, weakness, numbness and headaches. Psychiatric/Behavioral: Negative for agitation, confusion, decreased concentration, dysphoric mood, hallucinations, sleep disturbance and suicidal ideas. The patient is not nervous/anxious and is not hyperactive. Physical Exam  Constitutional:       Appearance: She is well-developed. HENT:      Head: Normocephalic. Eyes:      Conjunctiva/sclera: Conjunctivae normal.   Abdominal:      General: Abdomen is protuberant. Musculoskeletal:         General: No swelling. Neurological:      Mental Status: She is alert and oriented to person, place, and time. Psychiatric:         Mood and Affect: Mood normal.         Behavior: Behavior normal.         Thought Content: Thought content normal.         Judgment: Judgment normal.         Orders Only on 07/14/2021   Component Date Value Ref Range Status    Vit D, 25-Hydroxy 07/14/2021 12.8* >=30 ng/mL Final    Comment: <=20 ng/mL. ........... Cielo Burger Deficient  21-29 ng/mL. ......... Cielo Burger Insufficient  >=30 ng/mL. ........ Cielo Burger Sufficient      Vitamin B-12 07/14/2021 335  211 - 911 pg/mL Final    Folate 07/14/2021 6.98  4.78 - 24.20 ng/mL Final    Comment: Effective 11-15-16 10:00am EST  Please note reference ranges have  changed for Folate. Assessment and Plan:  1. Class 2 obesity  The patient is an appropriate candidate for weight loss. Losing weight will help the patient avoid/improve obesity related comorbidities. Discussed risks, benefits and alternatives of Qsymia. Patient meets BMI criteria, agrees to confirm negative pregnancy status monthly, denies any significant coronary artery disease, glaucoma or hyperthyroidism. she denies MAOI use within the past 14 days and has no known hypersensitivity to the sympathomimetic amines, kidney or liver impairment. Start Qsymia 3.75 mg/23 mg once daily in the morning for two weeks and then follow-up in two weeks to determine further dosing. Explained to patient that I will monitor her weight loss every 12 weeks and if she has not lost at least 5% of her body weight, that I will either increase the medication or discontinue it. Counseled patient on proper use and potential side effects. Explained to patient that the maximum dose of this medication will need to be tapered when she is ready to discontinue it. she understands that abrupt cessation of this dose may cause adverse reactions including seizures. she understands that it is her  responsibility to make sure that she does not run out of medications and to follow up to her appointments every 24 weeks as recommended. Heavily counseled on the importance of therapeutic lifestyle changes through diet and exercise. Discussed possible side effects of palpitations, irritability, paresthesias, dizziness, dysgeusia, insomnia, constipation and dry mouth. Patient is responsible for keeping her monthly appointments. Failure to comply with her monthly visits will result in discontinuing Qsymia. Patient advised to report any side effects. 2. Dietary counseling and surveillance  1200-John/low carb meal plan.      3. Vitamin D deficiency  Start Vit D 50,000 IU weekly x 8 weeks. Nutrition Plan: [] LCHF/Ketogenic   [x] Modified low-calorie diet (low carb/low-karla)               [] Low-calorie diet    [] Maintenance       []Other        Exercise: [x] Cardio     [x] Resistance/strength training                       [x] ACSM recommendations (150 minutes/week in active weight loss)               Behavior: [x] Motivational interviewing performed    [] Referralfor counseling                         [x] Discussed strategies to overcome habits/challenges for focus         [] Stress management   [x] Stimulus control         [] Sleep hygiene      Reviewed:  [x] Nutrition and the importance of regular protein intake  [x] Hidden carbohydrate sources  [x] Alcohol use  [x] Tobacco use   [x] Drug use- Denies  [x] Importance of exercise and reducingsedentary time  [x] Treatment consent- Patient understands and agrees with the treatment plan   [x] Proper use of medication and side effects  [x] OARRS report  [x] Labs  [x] EKG     Controlled Substance Monitoring:    No flowsheet data found. Patient denies any history of cardiovascular disease (e.g., CAD, stroke, arrhythmias, CHF, uncontrolled HTN), seizure disorder, MAOI use within the last 2 weeks, hyperthyroidism, glaucoma, agitated states, history of drug abuse, pregnancy, nursing, known hypersensitivity to the prescribing meds, history of pancreatitis, or personal or family history of thyroid medullary cancer. Treatment start date: 8/9/21  12 weeks: 11/9/21  Starting weight: 251 pounds   Goal: At least 5-10% (12.5-25 pounds)     Key dietary points:    - Meats (preferably organic or grass fed) are great sources of protein and have no carbohydrates. - Recommend coconut, olive, avocado, or almond oils. - When buying dairy, choose regular or full fat options.   - Choose vegetables that grow above ground as they are generally lower in carbohydrates and higher in fiber.  - Avoid starches such as bread, rice, potatoes, pasta and all sources of simple sugars (desserts, soda, breakfast cereals). - Choose beverages that are calorie and sugar free. Reminder regarding weight loss medications: You must be seen in office every 2-4 weeks to haveyour prescriptions refilled. If you are off of your medication for longer than 7 days, you will not be able to restart the medication for at least 6 months. Always call our office to report any side effects. Females, it is your responsibility to obtain negative pregnancy tests each month. No orders of the defined types were placed in this encounter. No follow-ups on file. Jose Walter is a 41 y.o. female being evaluated by a Virtual Visit (video visit) encounter to address concerns as mentioned above. A caregiver was present when appropriate. Due to this being a TeleHealth encounter (During JSN-43 public health emergency), evaluation of the following organ systems was limited: Vitals/Constitutional/EENT/Resp/CV/GI//MS/Neuro/Skin/Heme-Lymph-Imm. Pursuant to the emergency declaration under the [facility], [address] [place] [address] authority and the [employer] and [employer] Act, this Virtual Visit was conducted with patient's (and/or legal guardian's) consent, to reduce the patient's risk of exposure to COVID-19 and provide necessary medical care. The patient (and/or legal guardian) has also been advised to contact this office for worsening conditions or problems, and seek emergency medical treatment and/or call 911 if deemed necessary.

## 2021-08-10 ENCOUNTER — TELEPHONE (OUTPATIENT)
Dept: PRIMARY CARE CLINIC | Age: 41
End: 2021-08-10

## 2021-08-10 NOTE — TELEPHONE ENCOUNTER
lidocaine (LIDODERM) 5 % [[id number]]   Order Details   Dose: 1 patch Route: Transdermal Frequency: DAILY   Dispense Quantity: 30 patch Refills: 0         Sig: Place 1 patch onto the skin daily 12 hours on, 12 hours off. Start Date: 07/19/21 End Date: 08/18/21 after 30 doses   Written Date: 07/19/21 Expiration Date: 07/19/22      Diagnosis Association: History of back surgery (Z98.890);  Chronic bilateral low back pain without sciatica (M54.5 , G89.29)   Providers  Authorizing Provider: Josh Guerrero MD NPI: [id number]   Ordering User: Josh Guerrero MD        Pharmacy  [id number] - [place], [address] [phone] - F [phone]   [address] [place] [address] [facility] [address]   Phone: [phone] Fax: [phone]

## 2021-08-11 ENCOUNTER — TELEMEDICINE (OUTPATIENT)
Dept: BARIATRICS/WEIGHT MGMT | Age: 41
End: 2021-08-11
Payer: COMMERCIAL

## 2021-08-11 DIAGNOSIS — Z71.89 INDIVIDUAL COUNSELING ENCOUNTER: Primary | ICD-10-CM

## 2021-08-11 PROCEDURE — 96156 HLTH BHV ASSMT/REASSESSMENT: CPT | Performed by: SOCIAL WORKER

## 2021-08-11 NOTE — PROGRESS NOTES
Yahir Rae is a 41 y.o. female evaluated via video virtual visit on 8/11/2021. Yahir Rae presents today with diagnosis of individual counseling encounter related to behavioral health concerns. Patient is presenting in their home for initial assessment. Provider is evaluating virtually in home. Patient presented as open and honest throughout virtual appointment. Presenting Problem:   States she has done medical weight loss before and wants to make sure she is successful this time around    Home life / Family relationships / Supports:   Patient lives with her . States that her  is very health and eats healthier, but he is less active than she is. Hobbies/Positives:   Enjoys being outside (but hates heat and humidity), feels isolated due to covid and moving/not knowing anyone. Employment hx:  Patient reports [place] left [place] to [place] her 's job, but she is currently unemployed while she looks for a job    Psychiatric hx:  Struggling with infertility in the relationship    Hx of emotional/stress/bored eating:  \"i'm a bad bored eater. I get into bad habits easily. When things are bad I tend to indulge and then justify it\"     Daily Health Concerns/Limitations:  Back problems-had a spinal fusion at 31     Substance Use:  \"I don't smoke cigarettes, I smoke marijuana occasionally\"     Current needs / Limitations   \"I don't know where I'm situated and it's hard for me when I don't have a clear vision for future\"     Additional Questions/Concerns per patient  Has run into a few road blocks in regards to getting her medication    Behaviorist overview:   Patient appears to be struggling with motivation and follow through at this time. States that she is at a gray area in her life and has a hard time finding \"purpose\" at this time. Utilized strength's based approach and motivational interviewing.  Will provide patient with handouts on emotional, mindful eating and goal setting. Goals    None          Consent:  She and/or health care decision maker is aware that that she may receive a bill for this video service, depending on her insurance coverage, and has provided verbal consent to proceed: Yes      I affirm this is a Patient Initiated Episode with an Established Patient who has not had a related appointment within my department in the past 7 days or scheduled within the next 24 hours.     Total Time: minutes: 11-20 minutes    Note: not billable if this call serves to triage the patient into an appointment for the relevant concern      Shruthi Vargas, [address] ,[place]

## 2021-08-11 NOTE — TELEPHONE ENCOUNTER
PA was already done and denied for Lidocaine 5% patches (reference 7/23/21 telephone encounter). Patient's insurance will only approve the Lidocaine patches if patient has one of the following problems:  A) Pain associated with post-herpetic neuralgia, B) Pain associated with diabetic neuropathy, C) Pain associated with cancer-related neuropathy (including treatment-related neuropathy [e.g. neuropathy associated with radiation treatment or chemotherapy]. Please let me know if any of these apply. Please respond to the pool ( P [id number] [facility]). Thank you!

## 2021-08-18 ENCOUNTER — PATIENT MESSAGE (OUTPATIENT)
Dept: PRIMARY CARE CLINIC | Age: 41
End: 2021-08-18

## 2021-08-18 DIAGNOSIS — M25.511 CHRONIC PAIN OF BOTH SHOULDERS: ICD-10-CM

## 2021-08-18 DIAGNOSIS — M54.2 NECK PAIN: Primary | ICD-10-CM

## 2021-08-18 DIAGNOSIS — M25.512 CHRONIC PAIN OF BOTH SHOULDERS: ICD-10-CM

## 2021-08-18 DIAGNOSIS — G89.29 CHRONIC PAIN OF BOTH SHOULDERS: ICD-10-CM

## 2021-08-18 NOTE — TELEPHONE ENCOUNTER
From: Bib Bermudez  To: Deondre Chung MD  Sent: 8/18/2021 9:17 AM EDT  Subject: Non-Urgent Medical Question    Hi Dr. Maria Elena Cancino PT has been amazing. Looks like the psoas muscle is giving me issues but we are clearing that up. I didn't realize that the PTs couldn't also look at my neck and shoulders - they told me they were limited to the back because of regulations and would need another referral for neck and shoulders. Since I'm having such a positive experience, I would like to continue with them. Could you put in a script for my neck and shoulders?  Thank you!  ~Renay

## 2022-01-06 ENCOUNTER — OFFICE VISIT (OUTPATIENT)
Dept: PRIMARY CARE CLINIC | Age: 42
End: 2022-01-06
Payer: COMMERCIAL

## 2022-01-06 VITALS
OXYGEN SATURATION: 99 % | DIASTOLIC BLOOD PRESSURE: 84 MMHG | HEIGHT: 71 IN | BODY MASS INDEX: 35.98 KG/M2 | SYSTOLIC BLOOD PRESSURE: 134 MMHG | HEART RATE: 80 BPM | WEIGHT: 257 LBS | TEMPERATURE: 97.5 F

## 2022-01-06 DIAGNOSIS — Z01.818 PRE-OP TESTING: ICD-10-CM

## 2022-01-06 DIAGNOSIS — M54.12 CERVICAL RADICULOPATHY: ICD-10-CM

## 2022-01-06 DIAGNOSIS — Z01.818 PRE-OP TESTING: Primary | ICD-10-CM

## 2022-01-06 PROBLEM — M54.50 LOW BACK PAIN: Status: ACTIVE | Noted: 2021-08-09

## 2022-01-06 PROBLEM — M48.062 SPINAL STENOSIS, LUMBAR REGION WITH NEUROGENIC CLAUDICATION: Status: ACTIVE | Noted: 2021-11-01

## 2022-01-06 PROBLEM — M53.2X2 SPINAL INSTABILITIES, CERVICAL REGION: Status: ACTIVE | Noted: 2021-10-07

## 2022-01-06 PROBLEM — M54.16 RADICULOPATHY, LUMBAR REGION: Status: ACTIVE | Noted: 2021-11-01

## 2022-01-06 PROBLEM — M43.16 SPONDYLOLISTHESIS, LUMBAR REGION: Status: ACTIVE | Noted: 2021-11-01

## 2022-01-06 LAB
ANION GAP SERPL CALCULATED.3IONS-SCNC: 12 MMOL/L (ref 3–16)
APTT: 39.9 SEC (ref 26.2–38.6)
BASOPHILS ABSOLUTE: 0 K/UL (ref 0–0.2)
BASOPHILS RELATIVE PERCENT: 0.6 %
BUN BLDV-MCNC: 11 MG/DL (ref 7–20)
CALCIUM SERPL-MCNC: 10 MG/DL (ref 8.3–10.6)
CHLORIDE BLD-SCNC: 100 MMOL/L (ref 99–110)
CO2: 24 MMOL/L (ref 21–32)
CREAT SERPL-MCNC: 0.6 MG/DL (ref 0.6–1.1)
EOSINOPHILS ABSOLUTE: 0 K/UL (ref 0–0.6)
EOSINOPHILS RELATIVE PERCENT: 0.6 %
GFR AFRICAN AMERICAN: >60
GFR NON-AFRICAN AMERICAN: >60
GLUCOSE BLD-MCNC: 96 MG/DL (ref 70–99)
HCT VFR BLD CALC: 41.9 % (ref 36–48)
HEMOGLOBIN: 13.8 G/DL (ref 12–16)
INR BLD: 0.96 (ref 0.88–1.12)
LYMPHOCYTES ABSOLUTE: 1.6 K/UL (ref 1–5.1)
LYMPHOCYTES RELATIVE PERCENT: 23.4 %
MCH RBC QN AUTO: 31 PG (ref 26–34)
MCHC RBC AUTO-ENTMCNC: 32.9 G/DL (ref 31–36)
MCV RBC AUTO: 94.4 FL (ref 80–100)
MONOCYTES ABSOLUTE: 0.4 K/UL (ref 0–1.3)
MONOCYTES RELATIVE PERCENT: 6.3 %
NEUTROPHILS ABSOLUTE: 4.7 K/UL (ref 1.7–7.7)
NEUTROPHILS RELATIVE PERCENT: 69.1 %
PDW BLD-RTO: 13.1 % (ref 12.4–15.4)
PLATELET # BLD: 294 K/UL (ref 135–450)
PMV BLD AUTO: 8.6 FL (ref 5–10.5)
POTASSIUM SERPL-SCNC: 4.5 MMOL/L (ref 3.5–5.1)
PROTHROMBIN TIME: 10.8 SEC (ref 9.9–12.7)
RBC # BLD: 4.44 M/UL (ref 4–5.2)
SODIUM BLD-SCNC: 136 MMOL/L (ref 136–145)
WBC # BLD: 6.8 K/UL (ref 4–11)

## 2022-01-06 PROCEDURE — 93000 ELECTROCARDIOGRAM COMPLETE: CPT | Performed by: FAMILY MEDICINE

## 2022-01-06 PROCEDURE — 99214 OFFICE O/P EST MOD 30 MIN: CPT | Performed by: FAMILY MEDICINE

## 2022-01-06 NOTE — PROGRESS NOTES
Subjective:    Chief Complaint:     Azucena Montgomery is a 35 y.o. female who presents for a preoperative physical examination. She is scheduled to have cervical fusion done by Dr. Nikole Jon at [address] [place] [address] on 2021. History of Present Illness:    Emergent procedure No  Active Cardiac Condition No (decompensated HF, Arrhythmia, MI <3 weeks, severe valve disease)  Risk Level of Procedure Intermediate Risk (intraperitoneal, intrathoracic, HENT, orthopedic, or carotid endarterectomy, etc.)  Revised Cardiac Risk Index Risk factors: None    Further recommendations from consultants: None    Medication Recommendations: None     Past Medical History:   Diagnosis Date    Arthritis     Back pain     Migraine     Obesity (BMI 30-39. 9)         Review of patient's past surgical history indicates:     Past Surgical History:   Procedure Laterality Date    BACK SURGERY      BACK SURGERY      NASAL SEPTUM SURGERY      SPINE SURGERY                                                     No current outpatient medications on file. No current facility-administered medications for this visit.        Allergies   Allergen Reactions    Iodine        Social History     Tobacco Use    Smoking status: Former Smoker     Types: Cigarettes     Quit date:      Years since quittin.0    Smokeless tobacco: Never Used   Substance Use Topics    Alcohol use: Yes     Comment: social    Drug use: Yes     Types: Marijuana (Weed)     Comment: 3x week, chronic pain, will be getting medical card        Family History   Problem Relation Age of Onset    Migraines Mother     Heart Attack Father     High Blood Pressure Father     Hypertension Father     Elevated Lipids Father     Alcohol Abuse Father     COPD Maternal Grandmother     Asthma Maternal Grandmother         Review Of Systems  Skin: no abnormal pigmentation, rash, scaling, itching, masses, hair or nail changes  Eyes: negative  Ears/Nose/Throat: negative  Respiratory: negative  Cardiovascular: negative  Gastrointestinal: negative  Genitourinary: negative  Musculoskeletal: neck pain and stiffness, lower back pain, chronic  Neurologic: right UE tingling and tightness as well as neck pain  Psychiatric: negative  Hematologic/Lymphatic/Immunologic: negative  Endocrine: negative       Objective:      /84   Pulse 80   Temp 97.5 °F (36.4 °C) (Temporal)   Ht 5' 11\" (1.803 m)   Wt 257 lb (116.6 kg)   SpO2 99%   BMI 35.84 kg/m²   General appearance - healthy, alert, no distress  Skin - Skin color, texture, turgor normal. No rashes or lesions. Head - Normocephalic. No masses, lesions, tenderness or abnormalities  Eyes - conjunctivae/corneas clear. PERRL, EOM's intact. Ears - External ears normal. Canals clear. TM's normal.  Nose/Sinuses - Nares normal. Septum midline. Mucosa normal. No drainage or sinus tenderness. Oropharynx - Lips, mucosa, and tongue normal. Teeth and gums normal. Oropharynx pink and patent  Neck - Neck supple. No adenopathy. Thyroid symmetric, normal size  Back - Back symmetric, no curvature. ROM normal. No CVA tenderness. Lungs - Good diaphragmatic excursion. Lungs clear to ausc BL  Heart - Regular rate and rhythm, with no rub, murmur or gallop noted. Abdomen - Abdomen soft, non-tender. BS normal. No masses, no organomegaly  Extremities - Extremities normal. No deformities, edema, or skin discoloration  Musculoskeletal - spont ROM normal in all extremities. Muscular strength intact. Peripheral pulses - radial=4/4  Neuro - Gait normal. Patellar and biceps reflexes normal and symmetric. Sensation grossly normal.  No focal weakness     EKG: normal EKG, normal sinus rhythm. Assessment and Plan:   Avery Hernandes is a low risk for major cardiac complications during a intermediate risk procedure and may continue as planned per operating surgeon. See also orders filed with this encounter, if any.    Specific medication recommendations are above. Medications recommended to continue should be taken with a sip of water even when NPO. Diagnosis Orders   1. Pre-op testing  EKG 12 Lead    BASIC METABOLIC PANEL    CBC WITH AUTO DIFFERENTIAL    PROTIME-INR    APTT   2. Cervical radiculopathy         She is at low risk for cardiac event during surgery and anesthesia. No future appointments.      Mallie Kocher, MD  1/6/2022  8:31 AM

## 2022-03-18 ENCOUNTER — PATIENT MESSAGE (OUTPATIENT)
Dept: PRIMARY CARE CLINIC | Age: 42
End: 2022-03-18

## 2022-03-18 DIAGNOSIS — M54.50 CHRONIC BILATERAL LOW BACK PAIN WITHOUT SCIATICA: ICD-10-CM

## 2022-03-18 DIAGNOSIS — M54.2 NECK PAIN: ICD-10-CM

## 2022-03-18 DIAGNOSIS — G89.29 CHRONIC BILATERAL LOW BACK PAIN WITHOUT SCIATICA: ICD-10-CM

## 2022-03-18 DIAGNOSIS — M54.12 CERVICAL RADICULOPATHY: Primary | ICD-10-CM

## 2022-03-18 DIAGNOSIS — M25.511 CHRONIC PAIN OF BOTH SHOULDERS: ICD-10-CM

## 2022-03-18 DIAGNOSIS — Z98.890 HISTORY OF BACK SURGERY: ICD-10-CM

## 2022-03-18 DIAGNOSIS — G89.29 CHRONIC PAIN OF BOTH SHOULDERS: ICD-10-CM

## 2022-03-18 DIAGNOSIS — M25.512 CHRONIC PAIN OF BOTH SHOULDERS: ICD-10-CM

## 2022-03-18 NOTE — TELEPHONE ENCOUNTER
From: Ti Bermudez  To: Dr. Ramírez Burt: 3/18/2022 11:51 AM EDT  Subject: Requesting a Referral    Hi, I've started to have shoulder pain again (no arm pain) but with neck involvement. And my lumbar spine is having some pain and nerve issues. I want to explore pain management at [facility]. I have issues with my muscles just being very hard and very stiff virtually all the time. I would like to see Dr. Schmidt Better. Their fax number is [phone]. Thanks.

## 2022-10-10 DIAGNOSIS — Z12.31 ENCOUNTER FOR SCREENING MAMMOGRAM FOR MALIGNANT NEOPLASM OF BREAST: Primary | ICD-10-CM

## 2022-10-12 ENCOUNTER — TELEMEDICINE (OUTPATIENT)
Dept: PRIMARY CARE CLINIC | Age: 42
End: 2022-10-12
Payer: COMMERCIAL

## 2022-10-12 DIAGNOSIS — F32.A ANXIETY AND DEPRESSION: Primary | ICD-10-CM

## 2022-10-12 DIAGNOSIS — F41.9 ANXIETY AND DEPRESSION: Primary | ICD-10-CM

## 2022-10-12 PROCEDURE — 96127 BRIEF EMOTIONAL/BEHAV ASSMT: CPT | Performed by: FAMILY MEDICINE

## 2022-10-12 PROCEDURE — 99214 OFFICE O/P EST MOD 30 MIN: CPT | Performed by: FAMILY MEDICINE

## 2022-10-12 RX ORDER — HYDROXYZINE HYDROCHLORIDE 25 MG/1
1 TABLET, FILM COATED ORAL 3 TIMES DAILY PRN
COMMUNITY
Start: 2022-09-06

## 2022-10-12 RX ORDER — ESCITALOPRAM OXALATE 10 MG/1
1 TABLET ORAL DAILY
COMMUNITY
Start: 2022-09-30

## 2022-10-12 SDOH — ECONOMIC STABILITY: FOOD INSECURITY: WITHIN THE PAST 12 MONTHS, YOU WORRIED THAT YOUR FOOD WOULD RUN OUT BEFORE YOU GOT MONEY TO BUY MORE.: NEVER TRUE

## 2022-10-12 SDOH — ECONOMIC STABILITY: FOOD INSECURITY: WITHIN THE PAST 12 MONTHS, THE FOOD YOU BOUGHT JUST DIDN'T LAST AND YOU DIDN'T HAVE MONEY TO GET MORE.: NEVER TRUE

## 2022-10-12 ASSESSMENT — COLUMBIA-SUICIDE SEVERITY RATING SCALE - C-SSRS
2. HAVE YOU ACTUALLY HAD ANY THOUGHTS OF KILLING YOURSELF?: NO
1. WITHIN THE PAST MONTH, HAVE YOU WISHED YOU WERE DEAD OR WISHED YOU COULD GO TO SLEEP AND NOT WAKE UP?: YES
6. HAVE YOU EVER DONE ANYTHING, STARTED TO DO ANYTHING, OR PREPARED TO DO ANYTHING TO END YOUR LIFE?: NO

## 2022-10-12 ASSESSMENT — PATIENT HEALTH QUESTIONNAIRE - PHQ9
9. THOUGHTS THAT YOU WOULD BE BETTER OFF DEAD, OR OF HURTING YOURSELF: 1
SUM OF ALL RESPONSES TO PHQ QUESTIONS 1-9: 14
7. TROUBLE CONCENTRATING ON THINGS, SUCH AS READING THE NEWSPAPER OR WATCHING TELEVISION: 2
SUM OF ALL RESPONSES TO PHQ QUESTIONS 1-9: 13
5. POOR APPETITE OR OVEREATING: 1
6. FEELING BAD ABOUT YOURSELF - OR THAT YOU ARE A FAILURE OR HAVE LET YOURSELF OR YOUR FAMILY DOWN: 2
SUM OF ALL RESPONSES TO PHQ QUESTIONS 1-9: 14
2. FEELING DOWN, DEPRESSED OR HOPELESS: 2
4. FEELING TIRED OR HAVING LITTLE ENERGY: 1
3. TROUBLE FALLING OR STAYING ASLEEP: 3
SUM OF ALL RESPONSES TO PHQ QUESTIONS 1-9: 14
8. MOVING OR SPEAKING SO SLOWLY THAT OTHER PEOPLE COULD HAVE NOTICED. OR THE OPPOSITE, BEING SO FIGETY OR RESTLESS THAT YOU HAVE BEEN MOVING AROUND A LOT MORE THAN USUAL: 0
1. LITTLE INTEREST OR PLEASURE IN DOING THINGS: 2
SUM OF ALL RESPONSES TO PHQ9 QUESTIONS 1 & 2: 4

## 2022-10-12 ASSESSMENT — ANXIETY QUESTIONNAIRES
3. WORRYING TOO MUCH ABOUT DIFFERENT THINGS: 2
2. NOT BEING ABLE TO STOP OR CONTROL WORRYING: 2
1. FEELING NERVOUS, ANXIOUS, OR ON EDGE: 3
6. BECOMING EASILY ANNOYED OR IRRITABLE: 3
5. BEING SO RESTLESS THAT IT IS HARD TO SIT STILL: 3
GAD7 TOTAL SCORE: 18
4. TROUBLE RELAXING: 3
7. FEELING AFRAID AS IF SOMETHING AWFUL MIGHT HAPPEN: 2

## 2022-10-12 ASSESSMENT — ENCOUNTER SYMPTOMS
COUGH: 0
ABDOMINAL PAIN: 0
SHORTNESS OF BREATH: 0
DIARRHEA: 0
EYE PAIN: 0
CONSTIPATION: 0

## 2022-10-12 ASSESSMENT — SOCIAL DETERMINANTS OF HEALTH (SDOH): HOW HARD IS IT FOR YOU TO PAY FOR THE VERY BASICS LIKE FOOD, HOUSING, MEDICAL CARE, AND HEATING?: NOT HARD AT ALL

## 2022-10-12 NOTE — LETTER
[address] Second floor  [address]    Dr. Nicolas Carreon MD      To Whom It May Concern:     April Rodriguez is my patient, and has been under my care since 6/17/2021. I am intimately familiar with her history and with the functional limitations imposed by her emotional/mental related illness. Due to this emotional/mental disability, Vita has certain limitations related to social interaction/coping with stress/anxiety, etc. In order to help alleviate these difficulties, and to enhance her ability to function independently, I have prescribed Vita to obtain a pet or emotional support animal. The presence of this animal is necessary for the emotional/mental health of Vita because its presence will mitigate the symptoms, she is currently experiencing. I am licensed by the state of [place] to practice medicine.        Sincerely,     Nicolas Carreon MD  10/12/2022  1:14 PM

## 2022-10-12 NOTE — PROGRESS NOTES
10/12/2022    TELEHEALTH EVALUATION -- Audio/Visual (During OKNWR-21 public health emergency)    HPI:    Deion Coker (:  1980) has requested an audio/video evaluation for the following concern(s): Would like an HAN letter. IRIS 7 SCORE 10/12/2022   IRIS-7 Total Score 18   Interpretation of IRIS-7 score: 5-9 = mild anxiety, 10-14 = moderate anxiety, 15+ = severe anxiety. Recommend referral to behavioral health for scores 10 or greater. PHQ-9 Total Score: 14 (10/12/2022  1:05 PM)  Thoughts that you would be better off dead, or of hurting yourself in some way: 1 (10/12/2022  1:05 PM)    The patient was counseled on available options for suicide preventions including calling into the clinic, calling suicide prevention hotlines, or utilizing appropriate emergency services if needed    She is however feeling at her baseline and stable  She likes med regimen and doesn't want to change it. Interested in [facility]. Review of Systems   Constitutional:  Negative for appetite change, chills, fatigue and fever. HENT:  Negative for congestion. Eyes:  Negative for pain and visual disturbance. Respiratory:  Negative for cough and shortness of breath. Cardiovascular:  Negative for chest pain and palpitations. Gastrointestinal:  Negative for abdominal pain, constipation and diarrhea. Genitourinary:  Negative for difficulty urinating. Musculoskeletal:  Negative for arthralgias. Skin:  Negative for rash and wound. Neurological:  Negative for dizziness, weakness, light-headedness and headaches. Hematological:  Does not bruise/bleed easily. Psychiatric/Behavioral:  Positive for behavioral problems. The patient is nervous/anxious. Prior to Visit Medications    Medication Sig Taking?  Authorizing Provider   hydrOXYzine HCl (ATARAX) 25 MG tablet Take 1 tablet by mouth 3 times daily as needed Yes Historical Provider, MD   escitalopram (LEXAPRO) 10 MG tablet Take 1 tablet by mouth daily Yes Historical Provider, MD       Social History     Tobacco Use    Smoking status: Former     Types: Cigarettes     Quit date:      Years since quittin.7    Smokeless tobacco: Never   Substance Use Topics    Alcohol use: Yes     Comment: social    Drug use: Yes     Types: Marijuana (Weed)     Comment: 3x week, chronic pain, will be getting medical card        Allergies   Allergen Reactions    Iodine    ,   Past Medical History:   Diagnosis Date    Arthritis     Back pain     Migraine     Obesity (BMI 30-39.9)    ,   Past Surgical History:   Procedure Laterality Date    BACK SURGERY      BACK SURGERY      NASAL SEPTUM SURGERY  2015    SPINE SURGERY     ,   Social History     Tobacco Use    Smoking status: Former     Types: Cigarettes     Quit date:      Years since quittin.7    Smokeless tobacco: Never   Substance Use Topics    Alcohol use: Yes     Comment: social    Drug use: Yes     Types: Marijuana (Weed)     Comment: 3x week, chronic pain, will be getting medical card   ,   Family History   Problem Relation Age of Onset    Migraines Mother     Heart Attack Father     High Blood Pressure Father     Hypertension Father     Elevated Lipids Father     Alcohol Abuse Father     COPD Maternal Grandmother     Asthma Maternal Grandmother    ,   Immunization History   Administered Date(s) Administered    COVID-19, PFIZER Bivalent BOOSTER, (age 12y+), IM, 30 mcg/0.3 mL dose 2022    COVID-19, PFIZER PURPLE top, DILUTE for use, (age 15 y+), 30mcg/0.3mL 2021, 2021, 2021    Influenza Virus Vaccine 2021   ,   Health Maintenance   Topic Date Due    HIV screen  Never done    Hepatitis C screen  Never done    DTaP/Tdap/Td vaccine (1 - Tdap) Never done    Cervical cancer screen  Never done    Depression Monitoring  2022    Lipids  2026    Flu vaccine  Completed    COVID-19 Vaccine  Completed    Hepatitis A vaccine  Aged Out    Hib vaccine  Aged Out    Meningococcal (ACWY) vaccine  Aged Out    Pneumococcal 0-64 years Vaccine  Aged Out       PHYSICAL EXAMINATION:  [ INSTRUCTIONS:  \"[x]\" Indicates a positive item  \"[]\" Indicates a negative item  -- DELETE ALL ITEMS NOT EXAMINED]  [x] Alert  [x] Oriented to person/place/time    [x] No apparent distress  [] Toxic appearing    [] Face flushed appearing [x] Sclera clear  [] Lips are cyanotic      [x] Breathing appears normal  [] Appears tachypneic      [] Rash on visible skin    [x] Cranial Nerves II-XII grossly intact    [] Motor grossly intact in visible upper extremities    [] Motor grossly intact in visible lower extremities    [x] Normal Mood  [] Anxious appearing    [] Depressed appearing  [] Confused appearing      Due to this being a TeleHealth encounter, evaluation of the following organ systems is limited: Vitals/Constitutional/EENT/Resp/CV/GI//MS/Neuro/Skin/Heme-Lymph-Imm. ASSESSMENT/PLAN:  1. Anxiety and depression  Uncontrolled  Provided HAN letter  [facility] with [facility]  Follow up with me in about 3 months to gauge improvement and titrate meds if needed. Updated IRIS and PHQ and interpreted. - [place] BEHAV ASSMT W/SCORE & DOCD/STAND INSTRUMENT  - Ambulatory referral to Psychology      No follow-ups on file. Future Appointments   Date Time Provider Evelina Concepcion   10/28/2022  3:00 PM [facility] [address]       An  electronic signature was used to authenticate this note. --Priscilla Pineda MD on 10/12/2022 at 1:25 PM    {Coding Help -- Use CPT 82253-50369 with EM elements or Time rules for Office Visits. :    >20 minutes were spent on the digital evaluation and management of this patient.     Pursuant to the emergency declaration under the [facility], [address] [place] [address] authority and the [employer] Act, this Virtual  Visit was conducted, with patient's consent, to reduce the patient's risk of exposure to COVID-19 and provide continuity of care for an established patient. Services were provided through a video synchronous discussion virtually to substitute for in-person clinic visit.

## 2022-10-28 ENCOUNTER — HOSPITAL ENCOUNTER (OUTPATIENT)
Dept: WOMENS IMAGING | Age: 42
Discharge: HOME | End: 2022-10-28
Payer: COMMERCIAL

## 2022-10-28 VITALS — BODY MASS INDEX: 36.22 KG/M2 | HEIGHT: 70 IN | WEIGHT: 253 LBS

## 2022-10-28 DIAGNOSIS — Z12.31 ENCOUNTER FOR SCREENING MAMMOGRAM FOR MALIGNANT NEOPLASM OF BREAST: ICD-10-CM

## 2022-10-28 PROCEDURE — 77067 SCR MAMMO BI INCL CAD: CPT

## 2023-01-13 ENCOUNTER — TELEMEDICINE (OUTPATIENT)
Dept: PRIMARY CARE CLINIC | Age: 43
End: 2023-01-13

## 2023-01-13 DIAGNOSIS — F41.1 GAD (GENERALIZED ANXIETY DISORDER): Primary | ICD-10-CM

## 2023-01-13 DIAGNOSIS — F32.5 MAJOR DEPRESSIVE DISORDER WITH SINGLE EPISODE, IN FULL REMISSION (HCC): ICD-10-CM

## 2023-01-13 PROBLEM — F41.9 ANXIETY AND DEPRESSION: Status: RESOLVED | Noted: 2022-10-12 | Resolved: 2023-01-13

## 2023-01-13 PROBLEM — F32.A ANXIETY AND DEPRESSION: Status: RESOLVED | Noted: 2022-10-12 | Resolved: 2023-01-13

## 2023-01-13 RX ORDER — ESCITALOPRAM OXALATE 20 MG/1
20 TABLET ORAL DAILY
Qty: 90 TABLET | Refills: 3 | Status: SHIPPED | OUTPATIENT
Start: 2023-01-13

## 2023-01-13 ASSESSMENT — PATIENT HEALTH QUESTIONNAIRE - PHQ9
8. MOVING OR SPEAKING SO SLOWLY THAT OTHER PEOPLE COULD HAVE NOTICED. OR THE OPPOSITE, BEING SO FIGETY OR RESTLESS THAT YOU HAVE BEEN MOVING AROUND A LOT MORE THAN USUAL: 0
SUM OF ALL RESPONSES TO PHQ QUESTIONS 1-9: 0
4. FEELING TIRED OR HAVING LITTLE ENERGY: 0
1. LITTLE INTEREST OR PLEASURE IN DOING THINGS: 0
5. POOR APPETITE OR OVEREATING: 0
SUM OF ALL RESPONSES TO PHQ9 QUESTIONS 1 & 2: 0
SUM OF ALL RESPONSES TO PHQ QUESTIONS 1-9: 0
10. IF YOU CHECKED OFF ANY PROBLEMS, HOW DIFFICULT HAVE THESE PROBLEMS MADE IT FOR YOU TO DO YOUR WORK, TAKE CARE OF THINGS AT HOME, OR GET ALONG WITH OTHER PEOPLE: 0
SUM OF ALL RESPONSES TO PHQ QUESTIONS 1-9: 0
9. THOUGHTS THAT YOU WOULD BE BETTER OFF DEAD, OR OF HURTING YOURSELF: 0
7. TROUBLE CONCENTRATING ON THINGS, SUCH AS READING THE NEWSPAPER OR WATCHING TELEVISION: 0
SUM OF ALL RESPONSES TO PHQ QUESTIONS 1-9: 0
3. TROUBLE FALLING OR STAYING ASLEEP: 0
6. FEELING BAD ABOUT YOURSELF - OR THAT YOU ARE A FAILURE OR HAVE LET YOURSELF OR YOUR FAMILY DOWN: 0
2. FEELING DOWN, DEPRESSED OR HOPELESS: 0

## 2023-01-13 ASSESSMENT — ANXIETY QUESTIONNAIRES
3. WORRYING TOO MUCH ABOUT DIFFERENT THINGS: 0
GAD7 TOTAL SCORE: 6
IF YOU CHECKED OFF ANY PROBLEMS ON THIS QUESTIONNAIRE, HOW DIFFICULT HAVE THESE PROBLEMS MADE IT FOR YOU TO DO YOUR WORK, TAKE CARE OF THINGS AT HOME, OR GET ALONG WITH OTHER PEOPLE: SOMEWHAT DIFFICULT
5. BEING SO RESTLESS THAT IT IS HARD TO SIT STILL: 1
6. BECOMING EASILY ANNOYED OR IRRITABLE: 3
1. FEELING NERVOUS, ANXIOUS, OR ON EDGE: 1
4. TROUBLE RELAXING: 1
7. FEELING AFRAID AS IF SOMETHING AWFUL MIGHT HAPPEN: 0
2. NOT BEING ABLE TO STOP OR CONTROL WORRYING: 0

## 2023-01-13 ASSESSMENT — ENCOUNTER SYMPTOMS
NAUSEA: 0
VOMITING: 0

## 2023-01-13 NOTE — PROGRESS NOTES
2023       TELEHEALTH EVALUATION -- Audio/Visual (During HLZJJ-43 public health emergency)    HPI:    James Moffett (:  1980) has requested an audio/video evaluation for the following concern(s):    Mood Disorder:  Patient presents for follow-up of depression and anxiety disorder. Current complaints include: irritability and agitation. She denies anhedonia, depressed mood, tearfulness, feelings of hopelessness, feelings of worthlessness/excessive guilt, insomnia, hypersomnia, fatigue, changes in appetite/weight, decreased libido, difficulty concentrating, excessive worry, restlessness, and panic attacks. Symptoms/signs of juanita: none. External stressors: nothing new. Current treatment includes: Lexapro- 15. Medication side effects: none. Review of Systems   Constitutional:  Negative for activity change, fatigue and unexpected weight change. Gastrointestinal:  Negative for nausea and vomiting. Endocrine: Negative for cold intolerance and heat intolerance. Skin:  Negative for rash. Neurological:  Negative for dizziness, seizures and light-headedness. Psychiatric/Behavioral:  Positive for agitation. Negative for decreased concentration, dysphoric mood, self-injury, sleep disturbance and suicidal ideas. The patient is not nervous/anxious. Prior to Visit Medications    Medication Sig Taking?  Authorizing Provider   escitalopram (LEXAPRO) 20 MG tablet Take 1 tablet by mouth daily Yes Read DO Matthew   escitalopram (LEXAPRO) 10 MG tablet Take 1 tablet by mouth daily 15 mg tablets Yes Historical Provider, MD   hydrOXYzine HCl (ATARAX) 25 MG tablet Take 1 tablet by mouth 3 times daily as needed  Historical Provider, MD       Social History     Tobacco Use    Smoking status: Former     Types: Cigarettes     Quit date:      Years since quittin.0    Smokeless tobacco: Never   Substance Use Topics    Alcohol use: Yes     Comment: social    Drug use: Yes     Types: Marijuana Shellye Burkitt)     Comment: 3x week, chronic pain, will be getting medical card        Allergies   Allergen Reactions    Iodine        PHYSICAL EXAMINATION:  There were no vitals taken for this visit. Wt Readings from Last 3 Encounters:   10/28/22 253 lb (114.8 kg)   01/06/22 257 lb (116.6 kg)   07/02/21 258 lb (117 kg)       [ INSTRUCTIONS:  \"[x]\" Indicates a positive item  \"[]\" Indicates a negative item  -- DELETE ALL ITEMS NOT EXAMINED]  [x] Alert  [x] Oriented to person/place/time    [] No apparent distress  []  Appears Unwell:     [x] Breathing appears normal  [] Appears tachypneic      [] Rash on visible skin:    [x] Cranial Nerves II-XII grossly intact    [] Motor grossly intact in visible upper extremities    [] Motor grossly intact in visible lower extremities    [x] Normal Mood  [] Anxious appearing    [] Depressed appearing     [] OTHER:      Due to this being a TeleHealth encounter, evaluation of the following organ systems is limited: Vitals/Constitutional/EENT/Resp/CV/GI//MS/Neuro/Skin/Heme-Lymph-Imm. Lab Results   Component Value Date     01/06/2022    K 4.5 01/06/2022     01/06/2022    CO2 24 01/06/2022    BUN 11 01/06/2022    CREATININE 0.6 01/06/2022    GLUCOSE 96 01/06/2022    CALCIUM 10.0 01/06/2022    PROT 7.4 06/18/2021    LABALBU 4.8 06/18/2021    BILITOT 0.5 06/18/2021    ALKPHOS 72 06/18/2021    AST 27 06/18/2021    ALT 31 06/18/2021    LABGLOM >60 01/06/2022    GFRAA >60 01/06/2022    AGRATIO 1.8 06/18/2021    GLOB 2.6 06/18/2021     Lab Results   Component Value Date    LABA1C 5.1 06/18/2021     Lab Results   Component Value Date    EAG 99.7 06/18/2021         ASSESSMENT/PLAN:  1. IRIS (generalized anxiety disorder): IRIS-7 6. Definitely has noted improvement, but still very irritable and agitated. Was not able to schedule with therapy because of insurance issues. Would like to increase Lexapro to 20, which is reasonable.   We will have the office call and schedule her with  Jonh Echols our in office psychologist.  - escitalopram (LEXAPRO) 20 MG tablet; Take 1 tablet by mouth daily  Dispense: 90 tablet; Refill: 3  - [place] BEHAV ASSMT W/SCORE & DOCD/STAND INSTRUMENT    2. Major depressive disorder with single episode, in full remission [facility]): PHQ-9 is 0 today. Depression is well managed on current dose. - escitalopram (LEXAPRO) 20 MG tablet; Take 1 tablet by mouth daily  Dispense: 90 tablet; Refill: 3  - [place] BEHAV ASSMT W/SCORE & DOCD/STAND INSTRUMENT    Return in about 6 weeks (around 2/24/2023) for Depression/Anxiety. Antonyee Jin Bermudez, was evaluated through a synchronous (real-time) audio-video encounter. The patient (or guardian if applicable) is aware that this is a billable service, which includes applicable co-pays. This Virtual Visit was conducted with patient's (and/or legal guardian's) consent. The visit was conducted pursuant to the emergency declaration under the [address] [place] [address] and the [employer] Act. Patient identification was verified, and a caregiver was present when appropriate. The patient was located at Home: [address] [place] [address], [facility]. [address]. Provider was located at [facility] (Appt Dept): [address] [place] [address]. Total time spent for this encounter: Not billed by time    --Nehemias Neumann DO on 1/13/2023 at 7:22 AM    An electronic signature was used to authenticate this note. An  electronic signature was used to authenticate this note.     --Nehemias Neumann DO on 1/13/2023 at 7:22 AM        Pursuant to the emergency declaration under the [address] [place] [address] and the [employer] Act, this Virtual  Visit was conducted, with patient's consent, to reduce the patient's risk of exposure to COVID-19 and provide continuity of care for an established patient. Services were provided through a video synchronous discussion virtually to substitute for in-person clinic visit.

## 2023-01-13 NOTE — PATIENT INSTRUCTIONS
Patient Education        Anxiety Disorder: Care Instructions  Your Care Instructions     Anxiety is a normal reaction to stress. Difficult situations can cause you to have symptoms such as sweaty palms and a nervous feeling. In an anxiety disorder, the symptoms are far more severe. Constant worry, muscle tension, trouble sleeping, nausea and diarrhea, and other symptoms can make normal daily activities difficult or impossible. These symptoms may occur for no reason, and they can affect your work, school, or social life. Medicines, counseling, and self-care can all help. Follow-up care is a key part of your treatment and safety. Be sure to make and go to all appointments, and call your doctor if you are having problems. It's also a good idea to know your test results and keep a list of the medicines you take. How can you care for yourself at home? Take medicines exactly as directed. Call your doctor if you think you are having a problem with your medicine. Go to your counseling sessions and follow-up appointments. Recognize and accept your anxiety. Then, when you are in a situation that makes you anxious, say to yourself, \"This is not an emergency. I feel uncomfortable, but I am not in danger. I can keep going even if I feel anxious. \"  Be kind to your body:  Relieve tension with exercise or a massage. Get enough rest.  Avoid alcohol, caffeine, nicotine, and illegal drugs. They can increase your anxiety level and cause sleep problems. Learn and do relaxation techniques. See below for more about these techniques. Engage your mind. Get out and do something you enjoy. Go to a funny movie, or take a walk or hike. Plan your day. Having too much or too little to do can make you anxious. Keep a record of your symptoms. Discuss your fears with a good friend or family member, or join a support group for people with similar problems. Talking to others sometimes relieves stress.   Get involved in social groups, or volunteer to help others. Being alone sometimes makes things seem worse than they are. Get at least 30 minutes of exercise on most days of the week to relieve stress. Walking is a good choice. You also may want to do other activities, such as running, swimming, cycling, or playing tennis or team sports. Relaxation techniques  Do relaxation exercises 10 to 20 minutes a day. You can play soothing, relaxing music while you do them, if you wish. Tell others in your house that you are going to do your relaxation exercises. Ask them not to disturb you. Find a comfortable place, away from all distractions and noise. Lie down on your back, or sit with your back straight. Focus on your breathing. Make it slow and steady. Breathe in through your nose. Breathe out through either your nose or mouth. Breathe deeply, filling up the area between your navel and your rib cage. Breathe so that your belly goes up and down. Do not hold your breath. Breathe like this for 5 to 10 minutes. Notice the feeling of calmness throughout your whole body. As you continue to breathe slowly and deeply, relax by doing the following for another 5 to 10 minutes:  Tighten and relax each muscle group in your body. You can begin at your toes and work your way up to your head. Imagine your muscle groups relaxing and becoming heavy. Empty your mind of all thoughts. Let yourself relax more and more deeply. Become aware of the state of calmness that surrounds you. When your relaxation time is over, you can bring yourself back to alertness by moving your fingers and toes and then your hands and feet and then stretching and moving your entire body. Sometimes people fall asleep during relaxation, but they usually wake up shortly afterward. Always give yourself time to return to full alertness before you drive a car or do anything that might cause an accident if you are not fully alert. Never play a relaxation tape while you drive a car.   When should you call for help? Call 911 anytime you think you may need emergency care. For example, call if:    You feel you cannot stop from hurting yourself or someone else. Keep the numbers for these national suicide hotlines: 9-348-749-TALK (4-661.853.6994) and 8-989-GQWTFES (4-352.355.7398). If you or someone you know talks about suicide or feeling hopeless, get help right away. Watch closely for changes in your health, and be sure to contact your doctor if:    You have anxiety or fear that affects your life. You have symptoms of anxiety that are new or different from those you had before. Where can you learn more? Go to https://[employer]afers. org and sign in to your [employer] account. Enter P754 in the [employer] box to learn more about \"Anxiety Disorder: Care Instructions. \"     If you do not have an account, please click on the \"Sign Up Now\" link. Current as of: September 23, 2020               Content Version: 12.9  © 9091-6510 [employer], Incorporated. Care instructions adapted under license by [facility] [place] ([facility]). If you have questions about a medical condition or this instruction, always ask your healthcare professional. [address] any warranty or liability for your use of this information.

## 2023-01-19 ENCOUNTER — PATIENT MESSAGE (OUTPATIENT)
Dept: PRIMARY CARE CLINIC | Age: 43
End: 2023-01-19

## 2023-01-19 RX ORDER — ESCITALOPRAM OXALATE 10 MG/1
10 TABLET ORAL DAILY
Qty: 15 TABLET | Refills: 0 | Status: SHIPPED | OUTPATIENT
Start: 2023-01-19

## 2023-07-02 ENCOUNTER — PATIENT MESSAGE (OUTPATIENT)
Dept: PRIMARY CARE CLINIC | Age: 43
End: 2023-07-02

## 2023-07-02 DIAGNOSIS — Z13.1 SCREENING FOR DIABETES MELLITUS: Primary | ICD-10-CM

## 2023-07-03 DIAGNOSIS — Z13.1 SCREENING FOR DIABETES MELLITUS: ICD-10-CM

## 2023-07-03 NOTE — TELEPHONE ENCOUNTER
From: Pablo Bermudez  To: Dr. Tad Crigler: 7/2/2023 5:45 PM EDT  Subject: H1bAC    Hi, I have qualified for an [employer] loss study but the medical team wants me to have an H1bAC test. Can this be ordered for me from the lab? This is the study: The main purpose of this study is to evaluate the efficacy and safety of retatrutide once weekly in participants with obesity .

## 2023-07-04 LAB
EST. AVERAGE GLUCOSE BLD GHB EST-MCNC: 108.3 MG/DL
HBA1C MFR BLD: 5.4 %

## 2024-08-28 ENCOUNTER — TELEPHONE (OUTPATIENT)
Dept: PRIMARY CARE CLINIC | Age: 44
End: 2024-08-28